# Patient Record
(demographics unavailable — no encounter records)

---

## 2024-11-22 NOTE — HISTORY OF PRESENT ILLNESS
[postmenopausal] : postmenopausal [N] : Patient is not sexually active [Y] : Positive pregnancy history [Previously active] : previously active [Mammogramdate] : 2023 [TextBox_19] : SHERICE [BreastSonogramDate] : 2023 [TextBox_25] : DENSE TISSUE [PapSmeardate] : 2023 [TextBox_37] : SHERICE [ColonoscopyDate] : 2019 [PGHxTotal] : 4 [[facility] : 2 [[facility]] : 2 [PGHxABSpont] : 2 [FreeTextEntry1] : C-SECTIONS